# Patient Record
Sex: FEMALE | Race: WHITE | Employment: FULL TIME | URBAN - METROPOLITAN AREA
[De-identification: names, ages, dates, MRNs, and addresses within clinical notes are randomized per-mention and may not be internally consistent; named-entity substitution may affect disease eponyms.]

---

## 2019-02-18 ENCOUNTER — HOSPITAL ENCOUNTER (OUTPATIENT)
Dept: ULTRASOUND IMAGING | Age: 24
Discharge: HOME | End: 2019-02-18
Payer: COMMERCIAL

## 2019-02-18 DIAGNOSIS — R59.9 ENLARGED LYMPH NODE: ICD-10-CM

## 2019-02-18 PROCEDURE — 76642 ULTRASOUND BREAST LIMITED: CPT

## 2019-08-05 ENCOUNTER — HOSPITAL ENCOUNTER (OUTPATIENT)
Dept: ULTRASOUND IMAGING | Age: 24
Discharge: HOME | End: 2019-08-05
Payer: COMMERCIAL

## 2019-08-05 DIAGNOSIS — N63.0 BREAST NODULE: ICD-10-CM

## 2019-08-05 PROCEDURE — 76642 ULTRASOUND BREAST LIMITED: CPT

## 2024-11-20 ENCOUNTER — APPOINTMENT (OUTPATIENT)
Dept: GENERAL RADIOLOGY | Age: 29
End: 2024-11-20
Payer: COMMERCIAL

## 2024-11-20 ENCOUNTER — HOSPITAL ENCOUNTER (EMERGENCY)
Age: 29
Discharge: HOME | End: 2024-11-20
Attending: STUDENT IN AN ORGANIZED HEALTH CARE EDUCATION/TRAINING PROGRAM
Payer: COMMERCIAL

## 2024-11-20 VITALS
RESPIRATION RATE: 16 BRPM | HEART RATE: 64 BPM | DIASTOLIC BLOOD PRESSURE: 75 MMHG | SYSTOLIC BLOOD PRESSURE: 116 MMHG | HEIGHT: 63 IN | WEIGHT: 197.1 LBS | TEMPERATURE: 98.4 F | OXYGEN SATURATION: 100 % | BODY MASS INDEX: 34.92 KG/M2

## 2024-11-20 DIAGNOSIS — R07.9 CHEST PAIN, UNSPECIFIED TYPE: ICD-10-CM

## 2024-11-20 DIAGNOSIS — R07.89 CHEST WALL PAIN: Primary | ICD-10-CM

## 2024-11-20 LAB
ALBUMIN SERPL-MCNC: 4.5 G/DL (ref 3.4–5)
ALBUMIN/GLOB SERPL: 1.3 {RATIO} (ref 1.1–2.2)
ALP SERPL-CCNC: 94 U/L (ref 40–129)
ALT SERPL-CCNC: 14 U/L (ref 10–40)
ANION GAP SERPL CALCULATED.3IONS-SCNC: 11 MMOL/L (ref 3–16)
AST SERPL-CCNC: 15 U/L (ref 15–37)
BASOPHILS # BLD: 0.1 K/UL (ref 0–0.2)
BASOPHILS NFR BLD: 0.9 %
BILIRUB SERPL-MCNC: <0.2 MG/DL (ref 0–1)
BUN SERPL-MCNC: 10 MG/DL (ref 7–20)
CALCIUM SERPL-MCNC: 9.7 MG/DL (ref 8.3–10.6)
CHLORIDE SERPL-SCNC: 100 MMOL/L (ref 99–110)
CO2 SERPL-SCNC: 25 MMOL/L (ref 21–32)
CREAT SERPL-MCNC: <0.5 MG/DL (ref 0.6–1.1)
D-DIMER QUANTITATIVE: 0.28 UG/ML FEU (ref 0–0.6)
DEPRECATED RDW RBC AUTO: 13.3 % (ref 12.4–15.4)
EOSINOPHIL # BLD: 0.1 K/UL (ref 0–0.6)
EOSINOPHIL NFR BLD: 1.6 %
GFR SERPLBLD CREATININE-BSD FMLA CKD-EPI: >90 ML/MIN/{1.73_M2}
GLUCOSE SERPL-MCNC: 89 MG/DL (ref 70–99)
HCT VFR BLD AUTO: 39.1 % (ref 36–48)
HGB BLD-MCNC: 13 G/DL (ref 12–16)
LYMPHOCYTES # BLD: 3.2 K/UL (ref 1–5.1)
LYMPHOCYTES NFR BLD: 36.8 %
MCH RBC QN AUTO: 28.7 PG (ref 26–34)
MCHC RBC AUTO-ENTMCNC: 33.2 G/DL (ref 31–36)
MCV RBC AUTO: 86.6 FL (ref 80–100)
MONOCYTES # BLD: 0.5 K/UL (ref 0–1.3)
MONOCYTES NFR BLD: 6.3 %
NEUTROPHILS # BLD: 4.7 K/UL (ref 1.7–7.7)
NEUTROPHILS NFR BLD: 54.4 %
PLATELET # BLD AUTO: 230 K/UL (ref 135–450)
PMV BLD AUTO: 9.6 FL (ref 5–10.5)
POTASSIUM SERPL-SCNC: 4 MMOL/L (ref 3.5–5.1)
PROT SERPL-MCNC: 8 G/DL (ref 6.4–8.2)
RBC # BLD AUTO: 4.52 M/UL (ref 4–5.2)
SODIUM SERPL-SCNC: 136 MMOL/L (ref 136–145)
TROPONIN, HIGH SENSITIVITY: <6 NG/L (ref 0–14)
TROPONIN, HIGH SENSITIVITY: <6 NG/L (ref 0–14)
WBC # BLD AUTO: 8.6 K/UL (ref 4–11)

## 2024-11-20 PROCEDURE — 36415 COLL VENOUS BLD VENIPUNCTURE: CPT

## 2024-11-20 PROCEDURE — 84484 ASSAY OF TROPONIN QUANT: CPT

## 2024-11-20 PROCEDURE — 71046 X-RAY EXAM CHEST 2 VIEWS: CPT

## 2024-11-20 PROCEDURE — 80053 COMPREHEN METABOLIC PANEL: CPT

## 2024-11-20 PROCEDURE — 85025 COMPLETE CBC W/AUTO DIFF WBC: CPT

## 2024-11-20 PROCEDURE — 85379 FIBRIN DEGRADATION QUANT: CPT

## 2024-11-20 PROCEDURE — 99285 EMERGENCY DEPT VISIT HI MDM: CPT

## 2024-11-20 RX ORDER — IBUPROFEN 600 MG/1
600 TABLET, FILM COATED ORAL EVERY 6 HOURS PRN
Qty: 360 TABLET | Refills: 1 | Status: SHIPPED | OUTPATIENT
Start: 2024-11-20

## 2024-11-20 RX ORDER — ACETAMINOPHEN 325 MG/1
650 TABLET ORAL EVERY 6 HOURS PRN
Qty: 120 TABLET | Refills: 3 | Status: SHIPPED | OUTPATIENT
Start: 2024-11-20

## 2024-11-20 ASSESSMENT — LIFESTYLE VARIABLES
HOW MANY STANDARD DRINKS CONTAINING ALCOHOL DO YOU HAVE ON A TYPICAL DAY: PATIENT DOES NOT DRINK
HOW OFTEN DO YOU HAVE A DRINK CONTAINING ALCOHOL: NEVER

## 2024-11-21 NOTE — DISCHARGE INSTRUCTIONS
Follow-up primary doctor soon as able for reevaluation.  I do want to take Motrin Tylenol for the next couple days.  Return to emergency department for worsening chest discomfort if the chest discomfort does not only happen with certain movements if you begin have any shortness of breath, Lancer dizziness, blurred vision, focal deficits, motor or sensory changes or any new change or worsening symptoms were always here for reevaluation never hesitate to return

## 2024-11-21 NOTE — ED PROVIDER NOTES
Emergency Department Encounter    Patient: Katie Murphy  MRN: [id number]  : 1995  Date of Evaluation: 2024  ED Provider:  Jarrell Edward MD    Triage Chief Complaint:   Chest Pain (Chest pain on and off today at least 8 times.  Seems to always be moving when it happens. )    [place]:  Katie Murphy is a 29 y.o. female with history seen below presenting with complaints of chest pain.  Patient states that started this afternoon.  States she twisted awkwardly and started having chest discomfort over the anterior chest.  States it lasted for about 5 seconds and then resolved.  States intermittently throughout the day with certain movements or positioning of her body she will have short episodes of chest discomfort.  States they only last about 4 to 5 seconds certain or over the anterior chest and are sharp in nature.  States it did not occur with walking or exertion.  Denies any shortness of breath, lightheadedness or dizziness.  Denies lower extremity edema, orthopnea or signs of fluid overload.  Denies history of DVTs or blood clots in the past.  Denies recent trauma, recent travel, recent surgeries, hemoptysis, cancer but states she is on progesterone.  Denies nasal congestion, cough, sputum production, fevers or chills.  Denies rashes or lesions.  Denies abdominal pain, nausea vomiting, diarrhea constipation or urinary symptoms.  Denies previous cardiac history.  Denies MI or stroke in the past.  Denies family history of cardiac disease or death at a young age.  Denies hypertension, hyperlipidemia, diabetes.  States she does not drink alcohol.  Denies drug use.  Denies smoking history.  Patient states she is not sexually active and there is no chance of pregnancy in discussion with patient we will hold off on pregnancy testing    ROS - see HPI, below listed is current ROS at time of my eval:  At least 14 systems reviewed, negative other than HPI    Past Medical History:   Diagnosis Date